# Patient Record
Sex: MALE | Race: NATIVE HAWAIIAN OR OTHER PACIFIC ISLANDER | HISPANIC OR LATINO | Employment: UNEMPLOYED | URBAN - METROPOLITAN AREA
[De-identification: names, ages, dates, MRNs, and addresses within clinical notes are randomized per-mention and may not be internally consistent; named-entity substitution may affect disease eponyms.]

---

## 2021-04-14 ENCOUNTER — OFFICE VISIT (OUTPATIENT)
Dept: URGENT CARE | Facility: CLINIC | Age: 41
End: 2021-04-14
Payer: COMMERCIAL

## 2021-04-14 VITALS
HEIGHT: 65 IN | RESPIRATION RATE: 18 BRPM | HEART RATE: 94 BPM | OXYGEN SATURATION: 97 % | WEIGHT: 190 LBS | BODY MASS INDEX: 31.65 KG/M2 | SYSTOLIC BLOOD PRESSURE: 128 MMHG | DIASTOLIC BLOOD PRESSURE: 66 MMHG | TEMPERATURE: 99.3 F

## 2021-04-14 DIAGNOSIS — S91.332A PUNCTURE WOUND OF FOOT, LEFT, INITIAL ENCOUNTER: Primary | ICD-10-CM

## 2021-04-14 PROCEDURE — 90715 TDAP VACCINE 7 YRS/> IM: CPT

## 2021-04-14 PROCEDURE — 90471 IMMUNIZATION ADMIN: CPT | Performed by: PHYSICIAN ASSISTANT

## 2021-04-14 PROCEDURE — G0381 LEV 2 HOSP TYPE B ED VISIT: HCPCS | Performed by: PHYSICIAN ASSISTANT

## 2021-04-14 PROCEDURE — 90715 TDAP VACCINE 7 YRS/> IM: CPT | Performed by: PHYSICIAN ASSISTANT

## 2021-04-14 RX ORDER — CIPROFLOXACIN 250 MG/1
250 TABLET, FILM COATED ORAL EVERY 12 HOURS SCHEDULED
Qty: 14 TABLET | Refills: 0 | Status: SHIPPED | OUTPATIENT
Start: 2021-04-14 | End: 2021-04-21

## 2021-04-14 NOTE — PATIENT INSTRUCTIONS
Warm epsom salt soaks 5-10 minutes 2-3 times a day over next 3-5 days  Keep clean  May cover area with clean band aid if draining  Watch for signs of infections which would include increased redness, pain, swelling, nasty drainage at and around site of puncture wound  If any signs of infection start antibiotic  Seek further medical attention if such would develop  If high fever, chills, increasing pain of foot proceed immediately to emergency room for further evaluation  Herida punzante   LO QUE NECESITA SABER:   Se produce hira herida punzante cuando un objeto filoso y puntiagudo perfora la piel  El área puede estar amoratada o [place]  Es posible que usted presente sangrado, dolor o dificultad para  el área afectada  INSTRUCCIONES SOBRE EL FELECIA HOSPITALARIA:   Regrese a la dorys de emergencias si:  · Usted tiene dolor intenso  · Tiene adormecimiento o sensación de hormigueo en la earle de la herida  · La herida empieza a sangrar y no se detiene aún después de que usted aplica presión  Llame a garcia médico si:  · Garcia herida comienza a supurar o huele mal     · Usted tiene fiebre o escalofríos  · Carmina Ink, el enrojecimiento o el dolor Roxy Bita  · En la piel de garcia herida le salen unas kar delgado  · Usted tiene preguntas o inquietudes acerca de garcia condición o cuidado  Medicamentos: Es posible que usted necesite alguno de los siguientes:  · Los [place], elsa el ibuprofeno, [facility] a disminuir la inflamación, el dolor y la [place]  Stacy medicamento está disponible con o sin hira receta médica  Los IVONNE pueden causar sangrado estomacal o problemas renales en ciertas personas  Si usted stanley un medicamento anticoagulante, siempre pregúntele a garcia médico si los IVONNE son seguros para usted  Siempre madalyn la etiqueta de stacy medicamento y [employer] instrucciones  · Los antibióticos ayudan a evitar hira infección bacteriana  · [place] vianney medicamentos elsa se le haya indicado   Consulte con garcia médico si usted trey que garcia medicamento no le está ayudando o si presenta efectos secundarios  Infórmele si es alérgico a algún medicamento  Mantenga hira lista actualizada de los [place], las vitaminas y los productos herbales que stanley  Incluya los siguientes datos de los medicamentos: cantidad, frecuencia y motivo de administración  Traiga con usted la lista o los envases de las píldoras a vianney citas de seguimiento  Lleve la lista de los medicamentos con usted en nelsy de hira emergencia  Siga las instrucciones de garcia médico sobre el cuidado de vianney heridas: Mantenga la herida limpia y seca  Cuando le permitan bañarse, lave la herida cuidadosamente con agua y [place]  Seque el área y póngale vendajes nuevos y limpios elsa le indicaron  [place] vianney vendajes cuando se mojen o ensucien  Descanse y eleve el área lesionada por encima del nivel del corazón tan seguido elsa pueda  [place] va a disminuir inflamación y el dolor  Para mayor comodidad, apoye el [address] [place] [address] Street almohadas o [place]  Acuda a hira consulta de control con garcia médico dentro de 2 a 3 días: Anote vianney preguntas para que se acuerde de hacerlas otto vianney visitas  © Copyright [address] Information is for End User's use only and may not be sold, redistributed or otherwise used for commercial purposes  All illustrations and images included in CareNotes® are the copyrighted property of [email or web address]  or [address] [place] [place] [address] es sólo para uso en educación  Garcia intención no es darle un consejo médico sobre enfermedades o tratamientos  Colsulte con garcia Krista Noa farmacéutico antes de seguir cualquier régimen médico para saber si es seguro y efectivo para usted

## 2021-04-14 NOTE — PROGRESS NOTES
[address]    NAME: Nanci Moses is a 36 y o  male  : 1980    MRN: [id number]  DATE: 2021  TIME: 6:45 PM    Assessment and Plan   Puncture wound of foot, left, initial encounter [S91 332A]  1  Puncture wound of foot, left, initial encounter  TDAP VACCINE GREATER THAN OR EQUAL TO 8YO IM    ciprofloxacin (CIPRO) 250 mg tablet       Nurse administered Tdap vaccine  Nurse cleansed wound and applied clean dressing over top  Printed prescription given to patient and instructed to fill if signs of infection  Patient expressed understanding  Patient Instructions   Patient Instructions     Warm epsom salt soaks 5-10 minutes 2-3 times a day over next 3-5 days  Keep clean  May cover area with clean band aid if draining  Watch for signs of infections which would include increased redness, pain, swelling, nasty drainage at and around site of puncture wound  If any signs of infection start antibiotic  Seek further medical attention if such would develop  If high fever, chills, increasing pain of foot proceed immediately to emergency room for further evaluation  Herida punzante   LO QUE NECESITA SABER:   Se produce hira herida punzante cuando un objeto filoso y puntiagudo perfora la piel  El área puede estar amoratada o [place]  Es posible que usted presente sangrado, dolor o dificultad para  el área afectada  INSTRUCCIONES SOBRE EL FELECIA HOSPITALARIA:   Regrese a la dorys de emergencias si:  · Usted tiene dolor intenso  · Tiene adormecimiento o sensación de hormigueo en la earle de la herida  · La herida empieza a sangrar y no se detiene aún después de que usted aplica presión  Llame a garcia médico si:  · Garcia herida comienza a supurar o huele mal     · Usted tiene fiebre o escalofríos  · [place], el enrojecimiento o el dolor Earma [place]alea  · En la piel de garcia herida le salen unas kar delgado      · Usted tiene preguntas o inquietudes acerca de garcia condición o cuidado  Medicamentos: Es posible que usted necesite alguno de los siguientes:  · Los [place], elsa el ibuprofeno, [facility] a disminuir la inflamación, el dolor y la [place]  [place] medicamento está disponible con o sin hira receta médica  Los IVONNE pueden causar sangrado estomacal o problemas renales en ciertas personas  Si usted stanley un medicamento anticoagulante, siempre pregúntele a garcia médico si los IVONNE son seguros para usted  Siempre madalyn la etiqueta de kami medicamento y [place] instrucciones  · Los antibióticos ayudan a evitar hira infección bacteriana  · [place] vianney medicamentos elsa se le haya indicado  Consulte con garcia médico si usted trey que garcia medicamento no le está ayudando o si presenta efectos secundarios  Infórmele si es alérgico a algún medicamento  Mantenga hira lista actualizada de los [place], las vitaminas y los productos herbales que stanley  Incluya los siguientes datos de los medicamentos: cantidad, frecuencia y motivo de administración  Traiga con usted la lista o los envases de las píldoras a vianney citas de seguimiento  Lleve la lista de los medicamentos con usted en nelsy de hira emergencia  Siga las instrucciones de garcia médico sobre el cuidado de vianney heridas: Mantenga la herida limpia y seca  Cuando le permitan bañarse, lave la herida cuidadosamente con agua y [place]  Seque el área y póngale vendajes nuevos y limpios elsa le indicaron  [place] vianney vendajes cuando se mojen o ensucien  Descanse y eleve el área lesionada por encima del nivel del corazón tan seguido elsa pueda  [place] va a disminuir inflamación y el dolor  Para mayor comodidad, apoye el [address] [place] [address] almohadas o [place]  Acuda a hira consulta de control con garcia médico dentro de 2 a 3 días: Anote vianney preguntas para que se acuerde de hacerlas otto vianney visitas  © Copyright [facility] [address] Information is for End User's use only and may not be sold, redistributed or otherwise used for commercial purposes   All illustrations and images included in CareNotes® are the copyrighted property of A D A M , [place]  or [address] [place] [address] es sólo para uso en educación  Garcia intención no es darle un consejo médico sobre enfermedades o tratamientos  Colsulte con garcia Argelia Hernández farmacéutico antes de seguir cualquier régimen médico para saber si es seguro y efectivo para usted  Chief Complaint     Chief Complaint   Patient presents with    Foot Injury     Pt reports stepping on a nail and it going through his shoe, puncturing his left foot  Incident happened around 2pm today  Last Tetatnus unknown  History of Present Illness   Fiordaliza Shea presents to the clinic c/o    26-year-old male comes in after stepping on nail when he was outside earlier today  States nail went through his tennis shoe and into his foot a little ways  He indicates that nail was long but only want a little distance into his foot  Last tetanus vaccine unknown  Denies history of diabetes  Review of Systems   Review of Systems   Constitutional: Positive for activity change  Negative for appetite change, chills and fever  Musculoskeletal: Positive for gait problem  Skin: Positive for wound  Current Medications     No long-term medications on file  Current Allergies     Allergies as of 04/14/2021    (No Known Allergies)          The following portions of the patient's history were reviewed and updated as appropriate: allergies, current medications, past family history, past medical history, past social history, past surgical history and problem list   History reviewed  No pertinent past medical history  History reviewed  No pertinent surgical history  History reviewed  No pertinent family history  Objective   /66   Pulse 94   Temp 99 3 °F (37 4 °C) (Tympanic)   Resp 18   Ht 5' 5" (1 651 m)   Wt 86 2 kg (190 lb)   SpO2 97%   BMI 31 62 kg/m²   No LMP for male patient  Physical Exam     Physical Exam  Vitals signs and nursing note reviewed  Constitutional:       General: He is not in acute distress  Appearance: Normal appearance  He is well-developed  He is not ill-appearing, toxic-appearing or diaphoretic  Comments: Accompanied by friend who helps with translation  Antalgic gait  Cardiovascular:      Rate and Rhythm: Normal rate and regular rhythm  Pulmonary:      Effort: Pulmonary effort is normal    Skin:     General: Skin is warm and dry  Comments: Plantar surface left foot just under MCP joints with 2 mm puncture wound  Mild surrounding swelling   Plantar surface  No swelling dorsal surface  Good pedal pulses  Neurovascular intact distally  Localized TTP  Neurological:      Mental Status: He is alert and oriented to person, place, and time     Psychiatric:         Mood and Affect: Mood normal          Behavior: Behavior normal